# Patient Record
Sex: FEMALE | Race: WHITE | NOT HISPANIC OR LATINO | Employment: FULL TIME | URBAN - METROPOLITAN AREA
[De-identification: names, ages, dates, MRNs, and addresses within clinical notes are randomized per-mention and may not be internally consistent; named-entity substitution may affect disease eponyms.]

---

## 2018-11-09 LAB — RAPID GROUP A STREP (OHS): NEGATIVE

## 2019-04-04 ENCOUNTER — HISTORICAL (OUTPATIENT)
Dept: ADMINISTRATIVE | Facility: HOSPITAL | Age: 54
End: 2019-04-04

## 2019-04-04 LAB
ABS NEUT (OLG): 4.8 X10(3)/MCL (ref 2.1–9.2)
ALBUMIN SERPL-MCNC: 4.3 GM/DL (ref 3.4–5)
ALBUMIN/GLOB SERPL: 1.72 {RATIO} (ref 1.5–2.5)
ALP SERPL-CCNC: 49 UNIT/L (ref 38–126)
ALT SERPL-CCNC: 17 UNIT/L (ref 7–52)
APPEARANCE, UA: CLEAR
AST SERPL-CCNC: 17 UNIT/L (ref 15–37)
BACTERIA #/AREA URNS AUTO: ABNORMAL /HPF
BILIRUB SERPL-MCNC: 0.4 MG/DL (ref 0.2–1)
BILIRUB UR QL STRIP: NEGATIVE MG/DL
BILIRUBIN DIRECT+TOT PNL SERPL-MCNC: 0.1 MG/DL (ref 0–0.5)
BILIRUBIN DIRECT+TOT PNL SERPL-MCNC: 0.3 MG/DL
BUN SERPL-MCNC: 16 MG/DL (ref 7–18)
CALCIUM SERPL-MCNC: 9.8 MG/DL (ref 8.5–10)
CHLORIDE SERPL-SCNC: 102 MMOL/L (ref 98–107)
CHOLEST SERPL-MCNC: 168 MG/DL (ref 0–200)
CHOLEST/HDLC SERPL: 4 {RATIO}
CO2 SERPL-SCNC: 31 MMOL/L (ref 21–32)
COLOR UR: ABNORMAL
CREAT SERPL-MCNC: 0.79 MG/DL (ref 0.6–1.3)
ERYTHROCYTE [DISTWIDTH] IN BLOOD BY AUTOMATED COUNT: 11.1 % (ref 11.5–17)
GLOBULIN SER-MCNC: 2.5 GM/DL (ref 1.2–3)
GLUCOSE (UA): NEGATIVE MG/DL
GLUCOSE SERPL-MCNC: 102 MG/DL (ref 74–106)
HCT VFR BLD AUTO: 36.8 % (ref 37–47)
HDLC SERPL-MCNC: 42 MG/DL (ref 35–60)
HGB BLD-MCNC: 12.2 GM/DL (ref 12–16)
HGB UR QL STRIP: ABNORMAL UNIT/L
KETONES UR QL STRIP: NEGATIVE MG/DL
LDLC SERPL CALC-MCNC: 94 MG/DL (ref 0–129)
LEUKOCYTE ESTERASE UR QL STRIP: NEGATIVE UNIT/L
LYMPHOCYTES # BLD AUTO: 1.9 X10(3)/MCL (ref 0.6–3.4)
LYMPHOCYTES NFR BLD AUTO: 25.9 % (ref 13–40)
MCH RBC QN AUTO: 30.3 PG (ref 27–31.2)
MCHC RBC AUTO-ENTMCNC: 33 GM/DL (ref 32–36)
MCV RBC AUTO: 91 FL (ref 80–94)
MONOCYTES # BLD AUTO: 0.8 X10(3)/MCL (ref 0.1–1.3)
MONOCYTES NFR BLD AUTO: 10.3 % (ref 0.1–24)
NEUTROPHILS NFR BLD AUTO: 63.8 % (ref 47–80)
NITRITE UR QL STRIP.AUTO: NEGATIVE
PH UR STRIP: 5 [PH]
PLATELET # BLD AUTO: 212 X10(3)/MCL (ref 130–400)
PMV BLD AUTO: 8.8 FL (ref 9.4–12.4)
POTASSIUM SERPL-SCNC: 5.3 MMOL/L (ref 3.5–5.1)
PROT SERPL-MCNC: 6.8 GM/DL (ref 6.4–8.2)
PROT UR QL STRIP: NEGATIVE MG/DL
RBC # BLD AUTO: 4.03 X10(6)/MCL (ref 4.2–5.4)
RBC #/AREA URNS HPF: ABNORMAL /HPF
SODIUM SERPL-SCNC: 137 MMOL/L (ref 136–145)
SP GR UR STRIP: 1.01
SQUAMOUS EPITHELIAL, UA: ABNORMAL /LPF
TRIGL SERPL-MCNC: 169 MG/DL (ref 30–150)
UROBILINOGEN UR STRIP-ACNC: 0.2 MG/DL
VLDLC SERPL CALC-MCNC: 33.8 MG/DL
WBC # SPEC AUTO: 7.5 X10(3)/MCL (ref 4.5–11.5)
WBC #/AREA URNS AUTO: ABNORMAL /[HPF]

## 2019-07-29 ENCOUNTER — HISTORICAL (OUTPATIENT)
Dept: ADMINISTRATIVE | Facility: HOSPITAL | Age: 54
End: 2019-07-29

## 2020-01-20 ENCOUNTER — HISTORICAL (OUTPATIENT)
Dept: LAB | Facility: HOSPITAL | Age: 55
End: 2020-01-20

## 2020-01-20 ENCOUNTER — HISTORICAL (OUTPATIENT)
Dept: ADMINISTRATIVE | Facility: HOSPITAL | Age: 55
End: 2020-01-20

## 2020-07-24 ENCOUNTER — HISTORICAL (OUTPATIENT)
Dept: ADMINISTRATIVE | Facility: HOSPITAL | Age: 55
End: 2020-07-24

## 2020-07-24 LAB
ABS NEUT (OLG): 2.3 X10(3)/MCL (ref 2.1–9.2)
ALBUMIN SERPL-MCNC: 4.4 GM/DL (ref 3.4–5)
ALBUMIN/GLOB SERPL: 1.29 {RATIO} (ref 1.5–2.5)
ALP SERPL-CCNC: 70 UNIT/L (ref 38–126)
ALT SERPL-CCNC: 17 UNIT/L (ref 7–52)
APPEARANCE, UA: CLEAR
AST SERPL-CCNC: 21 UNIT/L (ref 15–37)
BACTERIA #/AREA URNS AUTO: ABNORMAL /HPF
BILIRUB SERPL-MCNC: 0.6 MG/DL (ref 0.2–1)
BILIRUB UR QL STRIP: NEGATIVE MG/DL
BILIRUBIN DIRECT+TOT PNL SERPL-MCNC: 0.1 MG/DL (ref 0–0.5)
BILIRUBIN DIRECT+TOT PNL SERPL-MCNC: 0.5 MG/DL
BUN SERPL-MCNC: 19 MG/DL (ref 7–18)
CALCIUM SERPL-MCNC: 9.7 MG/DL (ref 8.5–10)
CHLORIDE SERPL-SCNC: 103 MMOL/L (ref 98–107)
CHOLEST SERPL-MCNC: 176 MG/DL (ref 0–200)
CHOLEST/HDLC SERPL: 4.9 {RATIO}
CO2 SERPL-SCNC: 26 MMOL/L (ref 21–32)
COLOR UR: YELLOW
CREAT SERPL-MCNC: 0.85 MG/DL (ref 0.6–1.3)
DEPRECATED CALCIDIOL+CALCIFEROL SERPL-MC: 43.7 NG/ML (ref 30–80)
ERYTHROCYTE [DISTWIDTH] IN BLOOD BY AUTOMATED COUNT: 11.7 % (ref 11.5–17)
GLOBULIN SER-MCNC: 3.4 GM/DL (ref 1.2–3)
GLUCOSE (UA): NEGATIVE MG/DL
GLUCOSE SERPL-MCNC: 109 MG/DL (ref 74–106)
HCT VFR BLD AUTO: 38 % (ref 37–47)
HDLC SERPL-MCNC: 36 MG/DL (ref 35–60)
HGB BLD-MCNC: 12.3 GM/DL (ref 12–16)
HGB UR QL STRIP: ABNORMAL UNIT/L
KETONES UR QL STRIP: NEGATIVE MG/DL
LDLC SERPL CALC-MCNC: 109 MG/DL (ref 0–129)
LEUKOCYTE ESTERASE UR QL STRIP: NEGATIVE UNIT/L
LYMPHOCYTES # BLD AUTO: 2.2 X10(3)/MCL (ref 0.6–3.4)
LYMPHOCYTES NFR BLD AUTO: 44.4 % (ref 13–40)
MCH RBC QN AUTO: 29.1 PG (ref 27–31.2)
MCHC RBC AUTO-ENTMCNC: 32 GM/DL (ref 32–36)
MCV RBC AUTO: 90 FL (ref 80–94)
MONOCYTES # BLD AUTO: 0.5 X10(3)/MCL (ref 0.1–1.3)
MONOCYTES NFR BLD AUTO: 10.3 % (ref 0.1–24)
NEUTROPHILS NFR BLD AUTO: 45.3 % (ref 47–80)
NITRITE UR QL STRIP.AUTO: NEGATIVE
PH UR STRIP: 5.5 [PH]
PLATELET # BLD AUTO: 263 X10(3)/MCL (ref 130–400)
PMV BLD AUTO: 8.7 FL (ref 9.4–12.4)
POTASSIUM SERPL-SCNC: 4.7 MMOL/L (ref 3.5–5.1)
PROT SERPL-MCNC: 7.8 GM/DL (ref 6.4–8.2)
PROT UR QL STRIP: NEGATIVE MG/DL
RBC # BLD AUTO: 4.22 X10(6)/MCL (ref 4.2–5.4)
RBC #/AREA URNS HPF: ABNORMAL /HPF
SODIUM SERPL-SCNC: 139 MMOL/L (ref 136–145)
SP GR UR STRIP: 1.02
SQUAMOUS EPITHELIAL, UA: ABNORMAL /LPF
TRIGL SERPL-MCNC: 180 MG/DL (ref 30–150)
TSH SERPL-ACNC: 1.42 MIU/ML (ref 0.35–4.94)
UROBILINOGEN UR STRIP-ACNC: 0.2 MG/DL
VLDLC SERPL CALC-MCNC: 36 MG/DL
WBC # SPEC AUTO: 5 X10(3)/MCL (ref 4.5–11.5)
WBC #/AREA URNS AUTO: ABNORMAL /[HPF]

## 2020-07-27 LAB
EST. AVERAGE GLUCOSE BLD GHB EST-MCNC: 120 MG/DL
HBA1C MFR BLD: 5.8 % (ref 4.4–6.4)

## 2020-07-28 LAB
HPV16+18+H RISK 12 DNA CVX-IMP: NEGATIVE
PAP RECOMMENDATION EXT: NORMAL
PAP SMEAR: NORMAL

## 2020-08-05 LAB — BCS RECOMMENDATION EXT: NORMAL

## 2020-11-05 ENCOUNTER — HISTORICAL (OUTPATIENT)
Dept: ADMINISTRATIVE | Facility: HOSPITAL | Age: 55
End: 2020-11-05

## 2020-11-05 LAB
FLUAV AG NPH QL IA: NEGATIVE
FLUBV AG NPH QL IA: NEGATIVE

## 2021-02-19 ENCOUNTER — HISTORICAL (OUTPATIENT)
Dept: ADMINISTRATIVE | Facility: HOSPITAL | Age: 56
End: 2021-02-19

## 2021-02-19 LAB
ABS NEUT (OLG): 3.9 X10(3)/MCL (ref 2.1–9.2)
ALBUMIN SERPL-MCNC: 4.4 GM/DL (ref 3.4–5)
ALBUMIN/GLOB SERPL: 1.42 {RATIO} (ref 1.5–2.5)
ALP SERPL-CCNC: 105 UNIT/L (ref 38–126)
ALT SERPL-CCNC: 31 UNIT/L (ref 7–52)
AST SERPL-CCNC: 32 UNIT/L (ref 15–37)
BILIRUB SERPL-MCNC: 0.6 MG/DL (ref 0.2–1)
BILIRUBIN DIRECT+TOT PNL SERPL-MCNC: 0.1 MG/DL (ref 0–0.5)
BILIRUBIN DIRECT+TOT PNL SERPL-MCNC: 0.5 MG/DL
BUN SERPL-MCNC: 12 MG/DL (ref 7–18)
CALCIUM SERPL-MCNC: 9.9 MG/DL (ref 8.5–10)
CHLORIDE SERPL-SCNC: 102 MMOL/L (ref 98–107)
CHOLEST SERPL-MCNC: 183 MG/DL (ref 0–200)
CHOLEST/HDLC SERPL: 4.4 {RATIO}
CO2 SERPL-SCNC: 28 MMOL/L (ref 21–32)
CREAT SERPL-MCNC: 0.73 MG/DL (ref 0.6–1.3)
ERYTHROCYTE [DISTWIDTH] IN BLOOD BY AUTOMATED COUNT: 12 % (ref 11.5–17)
GLOBULIN SER-MCNC: 3.1 GM/DL (ref 1.2–3)
GLUCOSE SERPL-MCNC: 103 MG/DL (ref 74–106)
HCT VFR BLD AUTO: 40 % (ref 37–47)
HDLC SERPL-MCNC: 42 MG/DL (ref 35–60)
HGB BLD-MCNC: 13.2 GM/DL (ref 12–16)
IRON SERPL-MCNC: 112 UG/DL (ref 50–170)
LDLC SERPL CALC-MCNC: 108 MG/DL (ref 0–129)
LYMPHOCYTES # BLD AUTO: 2 X10(3)/MCL (ref 0.6–3.4)
LYMPHOCYTES NFR BLD AUTO: 30.9 % (ref 13–40)
MCH RBC QN AUTO: 29.3 PG (ref 27–31.2)
MCHC RBC AUTO-ENTMCNC: 33 GM/DL (ref 32–36)
MCV RBC AUTO: 89 FL (ref 80–94)
MONOCYTES # BLD AUTO: 0.6 X10(3)/MCL (ref 0.1–1.3)
MONOCYTES NFR BLD AUTO: 9.4 % (ref 0.1–24)
NEUTROPHILS NFR BLD AUTO: 59.7 % (ref 47–80)
PLATELET # BLD AUTO: 258 X10(3)/MCL (ref 130–400)
PMV BLD AUTO: 8.9 FL (ref 9.4–12.4)
POTASSIUM SERPL-SCNC: 4.8 MMOL/L (ref 3.5–5.1)
PROT SERPL-MCNC: 7.5 GM/DL (ref 6.4–8.2)
RBC # BLD AUTO: 4.5 X10(6)/MCL (ref 4.2–5.4)
SODIUM SERPL-SCNC: 139 MMOL/L (ref 136–145)
TRIGL SERPL-MCNC: 173 MG/DL (ref 30–150)
VLDLC SERPL CALC-MCNC: 34.6 MG/DL
WBC # SPEC AUTO: 6.5 X10(3)/MCL (ref 4.5–11.5)

## 2021-02-24 ENCOUNTER — HISTORICAL (OUTPATIENT)
Dept: ADMINISTRATIVE | Facility: HOSPITAL | Age: 56
End: 2021-02-24

## 2021-03-25 ENCOUNTER — HISTORICAL (OUTPATIENT)
Dept: RADIOLOGY | Facility: HOSPITAL | Age: 56
End: 2021-03-25

## 2021-06-27 LAB
INFLUENZA A ANTIGEN, POC: NEGATIVE
INFLUENZA B ANTIGEN, POC: NEGATIVE
SARS-COV-2 RNA RESP QL NAA+PROBE: NEGATIVE

## 2021-08-04 ENCOUNTER — HISTORICAL (OUTPATIENT)
Dept: ADMINISTRATIVE | Facility: HOSPITAL | Age: 56
End: 2021-08-04

## 2021-08-04 LAB
ABS NEUT (OLG): 3.6 X10(3)/MCL (ref 2.1–9.2)
ALBUMIN SERPL-MCNC: 4.3 GM/DL (ref 3.4–5)
ALBUMIN/GLOB SERPL: 1.54 {RATIO} (ref 1.5–2.5)
ALP SERPL-CCNC: 82 UNIT/L (ref 38–126)
ALT SERPL-CCNC: 19 UNIT/L (ref 7–52)
AST SERPL-CCNC: 23 UNIT/L (ref 15–37)
BILIRUB SERPL-MCNC: 0.4 MG/DL (ref 0.2–1)
BILIRUBIN DIRECT+TOT PNL SERPL-MCNC: 0.1 MG/DL (ref 0–0.5)
BILIRUBIN DIRECT+TOT PNL SERPL-MCNC: 0.3 MG/DL
BUN SERPL-MCNC: 14 MG/DL (ref 7–18)
CALCIUM SERPL-MCNC: 9.9 MG/DL (ref 8.5–10)
CHLORIDE SERPL-SCNC: 102 MMOL/L (ref 98–107)
CHOLEST SERPL-MCNC: 116 MG/DL (ref 0–200)
CHOLEST/HDLC SERPL: 3.3 {RATIO}
CO2 SERPL-SCNC: 33 MMOL/L (ref 21–32)
CREAT SERPL-MCNC: 0.92 MG/DL (ref 0.6–1.3)
ERYTHROCYTE [DISTWIDTH] IN BLOOD BY AUTOMATED COUNT: 11.5 % (ref 11.5–17)
FERRITIN SERPL-MCNC: 39.27 NG/ML (ref 4.63–204)
GLOBULIN SER-MCNC: 2.8 GM/DL (ref 1.2–3)
GLUCOSE SERPL-MCNC: 114 MG/DL (ref 74–106)
HCT VFR BLD AUTO: 38.4 % (ref 37–47)
HDLC SERPL-MCNC: 35 MG/DL (ref 35–60)
HGB BLD-MCNC: 12.5 GM/DL (ref 12–16)
LDLC SERPL CALC-MCNC: 48 MG/DL (ref 0–129)
LYMPHOCYTES # BLD AUTO: 2.1 X10(3)/MCL (ref 0.6–3.4)
LYMPHOCYTES NFR BLD AUTO: 34.1 % (ref 13–40)
MCH RBC QN AUTO: 29.9 PG (ref 27–31.2)
MCHC RBC AUTO-ENTMCNC: 33 GM/DL (ref 32–36)
MCV RBC AUTO: 92 FL (ref 80–94)
MONOCYTES # BLD AUTO: 0.5 X10(3)/MCL (ref 0.1–1.3)
MONOCYTES NFR BLD AUTO: 8.3 % (ref 0.1–24)
NEUTROPHILS NFR BLD AUTO: 57.6 % (ref 47–80)
PLATELET # BLD AUTO: 253 X10(3)/MCL (ref 130–400)
PMV BLD AUTO: 9.9 FL (ref 9.4–12.4)
POTASSIUM SERPL-SCNC: 4.6 MMOL/L (ref 3.5–5.1)
PROT SERPL-MCNC: 7.1 GM/DL (ref 6.4–8.2)
RBC # BLD AUTO: 4.18 X10(6)/MCL (ref 4.2–5.4)
SODIUM SERPL-SCNC: 142 MMOL/L (ref 136–145)
TRIGL SERPL-MCNC: 155 MG/DL (ref 30–150)
TSH SERPL-ACNC: 1.23 MIU/ML (ref 0.35–4.94)
VLDLC SERPL CALC-MCNC: 31 MG/DL
WBC # SPEC AUTO: 6.2 X10(3)/MCL (ref 4.5–11.5)

## 2022-01-18 LAB
INFLUENZA A ANTIGEN, POC: NEGATIVE
INFLUENZA B ANTIGEN, POC: NEGATIVE
RAPID GROUP A STREP (OHS): POSITIVE
SARS-COV-2 RNA RESP QL NAA+PROBE: NEGATIVE

## 2022-04-10 ENCOUNTER — HISTORICAL (OUTPATIENT)
Dept: ADMINISTRATIVE | Facility: HOSPITAL | Age: 57
End: 2022-04-10
Payer: COMMERCIAL

## 2022-04-24 VITALS
OXYGEN SATURATION: 97 % | HEIGHT: 67 IN | SYSTOLIC BLOOD PRESSURE: 123 MMHG | BODY MASS INDEX: 30.29 KG/M2 | WEIGHT: 193 LBS | DIASTOLIC BLOOD PRESSURE: 74 MMHG

## 2022-05-03 NOTE — HISTORICAL [facility] CERNER
This is a historical note converted from Padmini. Formatting and pictures may have been removed.  Please reference Padmini for original formatting and attached multimedia. Chief Complaint  PT C/O PERSISTENT COUGH THAT STARTED ABOUT 1 MONTH AGO. ?PT FELL IN JUNE AND INJURED RIB. ?SHE BROUGHT COPY OF XRAY.  History of Present Illness  Patient is here today with complaints of a dry?chronic cough?for the past month.? She was seen at the walk-in clinic?at the beginning of June?after falling?from a chair?and landing on the chair?with her ribs/sternum.? X-ray at that time was negative for fracture.? She did have a history of mild asthma?as a child?but nothing that she uses?an inhaler for currently.? She has seasonal allergies but lately?has not had?many symptoms. ?No nasal congestion or postnasal drip.? She did have an episode of GERD?last week?which is not typical?for her.? Her symptom of cough seems to be worse at night. ?She does have a history of breast cancer 4 years ago?treated with surgery, chemo, radiation, and tamoxifen.  Review of Systems  GENERAL:?no? fatigue,? no recent uri symptoms  RESP:?noSOB,? ?+?cough,?+ ?Hx of asthma  CARDIAC:? ?no? chest pain,? ?no? palpations  GI:? ?no? N&V,? ?no? abd pain,?+ ?GERD symptoms last week x 1 day only  NEURO:? ?no? headache,? ?no? dizziness  Physical Exam  Vitals & Measurements  HR:?100(Peripheral)? BP:?126/80?  HT:?170.1?cm? WT:?85.7?kg? BMI:?29.62?  GENERAL:? NAD  HEENT:? PERRLA, EOMI  CHEST:? CTA bilaterally  CARDIAC:? RRR, no murmurs audible  ABD:? soft, NT/ND, NBSx4  Assessment/Plan  1.?Recurrent dry cough?R05  ?CXR today neg-----Trial? OTC omeprazole?20 ?mg and claritin, call with update 10 days--consider adding Breo if persist.  Ordered:  Office/Outpatient Visit Level 3 Established 20005 PC, Recurrent dry cough, [facility], 07/29/19 15:21:00 CDT  XR Chest 2 Views, Routine, 07/29/19 15:06:00 CDT, Other (please specify), None, Ambulatory, Rad Type, Recurrent dry  cough, [facility], 07/29/19 15:06:00 CDT  ?  Orders:  999XX, 07/29/19 15:23:00 CDT, [facility], 07/29/19 15:23:00 CDT  Clinic Follow-up PRN, 07/29/19 15:21:00 CDT, [facility], Future Order  Referrals  Clinic Follow-up PRN, 07/29/19 15:21:00 CDT, [facility], Future Order   Problem List/Past Medical History  Ongoing  Breast cancer RT  Depression  Endometriosis  Hypercholesteremia  Hypertension  Kidney stone  MVP (mitral valve prolapse)  Optic nerve hemorrhage  Peripheral arterial disease  PVC  Recurrent dry cough  Historical  No qualifying data  Procedure/Surgical History  Lumpectomy of breast (2015)  Endometriosis (2003)  Histology tonsillectomy   Medications  aspirin 81 mg oral tablet, 81 mg= 1 tab(s), Oral, Daily  atorvastatin 20 mg oral tablet, 20 mg= 1 tab(s), Oral, Daily, 3 refills  Caltrate 600 mg oral tablet, 1200 mg= 2 tab(s), Oral, Daily  CLONAZEPAM 0.5 MG TAB NORT, 0.5 mg= 1 tab(s), Oral, TID  Coreg 6.25 mg oral tablet, 6.25 mg= 1 tab(s), Oral, BID, 1 refills  Fosamax 70 mg oral tablet, 70 mg= 1 tab(s), Oral, qWeek  LOSARTAN POTASSIUM 100 MG TAB, 100 mg= 1 tab(s), Oral, Daily  TAMOXIFEN 20 MG TABLET, 20 mg= 1 tab(s), Oral, Daily  VENLAFAXINE HCL  MG CAP, 150 mg= 1 cap(s), Oral, Daily  VENLAFAXINE HCL ER 75 MG CAP, 75 mg= 1 cap(s), Oral, Daily  Allergies  ACE inhibitors?(Cough)  Niaspan ER?(Rash)  Toradol  pravastatin?(numbness)  Social History  Abuse/Neglect  No, 07/29/2019  Alcohol  Never, 02/20/2018  Employment/School  Employed, 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  CAD - Coronary  artery disease: Mother.  Cancer: Mother and Father.  Depression.: Father.  Diabetes mellitus type 2: Mother.  Drug addiction.: Brother.  Hypercholesterolemia: Father.  Hypertension.: Mother.  Primary malignant neoplasm of lung: Mother.  Primary malignant neoplasm of prostate: Father.  Immunizations  Vaccine Date Status Comments   influenza virus vaccine, inactivated 09/29/2018 Recorded    influenza virus vaccine, inactivated 10/01/2017 Recorded [3/15/2018] Recd at [facility]  Health Maintenance  ???Pending?(in the next year)  ??? ??OverDue  ??? ? ? ?Diabetes Screening due??and every?  ??? ? ? ?Alcohol Misuse Screening due??01/01/19??and every 1??year(s)  ??? ??Due?  ??? ? ? ?ADL Screening due??07/29/19??and every 1??year(s)  ??? ? ? ?Hypertension Management-Education due??07/29/19??and every 1??year(s)  ??? ? ? ?Tetanus Vaccine due??07/29/19??and every 10??year(s)  ??? ??Due In Future?  ??? ? ? ?Obesity Screening not due until??01/01/20??and every 1??year(s)  ??? ? ? ?Hypertension Management-BMP not due until??04/03/20??and every 1??year(s)  ??? ? ? ?Breast Cancer Screening not due until??06/19/20??and every 2??year(s)  ??? ? ? ?Blood Pressure Screening not due until??07/28/20??and every 1??year(s)  ??? ? ? ?Body Mass Index Check not due until??07/28/20??and every 1??year(s)  ??? ? ? ?Hypertension Management-Blood Pressure not due until??07/28/20??and every 1??year(s)  ???Satisfied?(in the past 1 year)  ??? ??Satisfied?  ??? ? ? ?Blood Pressure Screening on??07/29/19.??Satisfied by Heather Austin CMA  ??? ? ? ?Body Mass Index Check on??07/29/19.??Satisfied by Heather Austin CMA  ??? ? ? ?Cervical Cancer Screening on??04/25/19.??Satisfied by Jenifer Mariano  ??? ? ? ?Depression Screening on??01/25/19.??Satisfied by Leanne Maza  ??? ? ? ?Diabetes Screening on??04/04/19.??Satisfied by Dustin Davenport  ??? ? ? ?Hypertension Management-Blood Pressure on??07/29/19.??Satisfied by Geovanna  Heather CRUZ  ??? ? ? ?Influenza Vaccine on??09/29/18.??Satisfied by Zuleika Hope LPN  ??? ? ? ?Lipid Screening on??04/04/19.??Satisfied by Dustin Davenport  ??? ? ? ?Obesity Screening on??07/29/19.??Satisfied by Heather Austin CMA  ?

## 2022-05-03 NOTE — HISTORICAL [facility] CERNER
This is a historical note converted from Padmini. Formatting and pictures may have been removed.  Please reference Padmini for original formatting and attached multimedia. Chief Complaint  PT C/O COUGH THAT IS NOT BETTER SINCE LAST SEEN. ?PT FINISHED ANTIBX AND PREDNISONE. ?PT TAKES COUGH MED AT NIGHT AND IS ABLE TO SLEEP. ?PT FEELS RATTLING IN THROAT.  History of Present Illness  Patient is here today with complaints of?persistent cough and chest congestion?for the past 10 days.? She was initially seen at the walk-in clinic?and placed on Augmentin?however her symptoms continue to worsen?and presented to our clinic on?January 14?at which time Zithromax pack was added, started on?sample Brio inhaler, and given?prednisone 20 mg daily for 5 days.? She reports that the cough syrup has helped her rest at night however?the congestion in her chest has worsened.  Review of Systems  GENERAL:?no?fever,??nochills  HEENT:??nosore throat,?+?nasal congestion,clearrhinorrhea,??nosinus pressure,??noear pain,?_lymphadenopathy  CHEST:??+?cough,?no?wheezing,?no?sob,?unable to produce?sputum production  Physical Exam  Vitals & Measurements  T:?36.7? ?C (Oral)? HR:?98(Peripheral)? BP:?116/88? SpO2:?95%?  HT:?170?cm? WT:?90.4?kg? BMI:?31.28?  GENERAL:? No apparent distress  HEENT:? mouth clear, throat ?clear, nares ?within normal limits, tympanic membranes ?wnl bilaterally, ?no?cervical lymphadenopathy  CHEST:?+ wheezing bilaterally  CARDIAC: RRR no murmurs audible  Assessment/Plan  Bronchitis?J40  ?Chest x-ray--neg ---continue Breo---- Mycoplasma PCR today--already completed zpack--add mucinex  Ordered:  Mycoplasma by PCR, Routine collect, Throat, Order for future visit, 01/20/20 14:10:00 CST, Stop date 01/20/20 14:10:00 CST, Nurse collect, Bronchitis, 01/20/20 14:10:00 CST  Office/Outpatient Visit Level 2 Established [id number] PC, Bronchitis, [facility], 01/20/20 14:10:00 CST  XR Chest 2 Views, Routine, 01/20/20 13:44:00 CST, Other  (please specify), None, Ambulatory, Rad Type, Bronchitis, [facility], 01/20/20 13:44:00 CST  ?  Orders:  999XX, 01/20/20 14:10:00 CST, [facility], 01/20/20 14:10:00 CST  Clinic Follow-up PRN, 01/20/20 14:10:00 CST, [facility], Future Order  Referrals  Clinic Follow-up PRN, 01/20/20 14:10:00 CST, [facility], Future Order   Problem List/Past Medical History  Ongoing  Breast cancer RT  Depression  Endometriosis  Hypercholesteremia  Hypertension  Kidney stone  MVP (mitral valve prolapse)  Obesity  Optic nerve hemorrhage  Peripheral arterial disease  PVC  Recurrent dry cough  Historical  No qualifying data  Procedure/Surgical History  History of left-sided carotid endarterectomy (04/17/2019)  Lumpectomy of breast (2015)  Endometriosis (2003)  Histology tonsillectomy   Medications  aspirin 81 mg oral tablet, 81 mg= 1 tab(s), Oral, Daily  atorvastatin 20 mg oral tablet, 20 mg= 1 tab(s), Oral, Daily, 3 refills  Caltrate 600 mg oral tablet, 1200 mg= 2 tab(s), Oral, Daily  chlorpheniramine-hydrocodone 8 mg-10 mg/5 mL oral suspension, extended release, 5 mL, Oral, q12hr, PRN  CLONAZEPAM 0.5 MG TAB NORT, 0.5 mg= 1 tab(s), Oral, TID  Coreg 6.25 mg oral tablet, 6.25 mg= 1 tab(s), Oral, BID, 1 refills  Fosamax 70 mg oral tablet, 70 mg= 1 tab(s), Oral, qWeek  LOSARTAN POTASSIUM 100 MG TAB, 100 mg= 1 tab(s), Oral, Daily  TAMOXIFEN 20 MG TABLET, 20 mg= 1 tab(s), Oral, Daily  VENLAFAXINE HCL  MG CAP, 150 mg= 1 cap(s), Oral, Daily  VENLAFAXINE HCL ER 75 MG CAP, 75 mg= 1 cap(s), Oral, Daily  Allergies  Toradol?(Itching)  ACE inhibitors?(Cough)  Niaspan ER?(Rash)  Trintellix?(Rash)  pravastatin?(numbness)  Social History  Abuse/Neglect  No, 01/20/2020  No, 01/14/2020  No, 09/07/2019  Alcohol  Never, 02/20/2018  Employment/School  Employed, 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  CAD - Coronary artery disease: Mother.  Cancer: Mother and Father.  Depression.: Father.  Diabetes mellitus type 2: Mother.  Drug addiction.: Brother.  Hypercholesterolemia: Father.  Hypertension.: Mother.  Primary malignant neoplasm of lung: Mother.  Primary malignant neoplasm of prostate: Father.  Immunizations  Vaccine Date Status Comments   influenza virus vaccine, inactivated 09/29/2018 Recorded    influenza virus vaccine, inactivated 10/01/2017 Recorded [3/15/2018] Recd at [facility]  Health Maintenance  ???Pending?(in the next year)  ??? ??OverDue  ??? ? ? ?Diabetes Screening due??and every?  ??? ??Due?  ??? ? ? ?Alcohol Misuse Screening due??01/01/20??and every 1??year(s)  ??? ? ? ?ADL Screening due??01/20/20??and every 1??year(s)  ??? ? ? ?Hypertension Management-Education due??01/20/20??and every 1??year(s)  ??? ? ? ?Tetanus Vaccine due??01/20/20??and every 10??year(s)  ??? ??Due In Future?  ??? ? ? ?Hypertension Management-BMP not due until??04/03/20??and every 1??year(s)  ??? ? ? ?Breast Cancer Screening not due until??06/19/20??and every 2??year(s)  ??? ? ? ?Obesity Screening not due until??01/01/21??and every 1??year(s)  ??? ? ? ?Blood Pressure Screening not due until??01/19/21??and every 1??year(s)  ??? ? ? ?Body Mass Index Check not due until??01/19/21??and every 1??year(s)  ??? ? ? ?Hypertension Management-Blood Pressure not due until??01/19/21??and every 1??year(s)  ???Satisfied?(in the past 1 year)  ??? ??Satisfied?  ??? ? ? ?Blood Pressure Screening on??01/20/20.??Satisfied by Heather Austin CMA  ??? ? ?  ?Body Mass Index Check on??01/20/20.??Satisfied by Heather Austin CMA L.  ??? ? ? ?Cervical Cancer Screening on??04/25/19.??Satisfied by Jenifer Mariano  ??? ? ? ?Depression Screening on??01/25/19.??Satisfied by Leanne Maza  ??? ? ? ?Diabetes Screening on??04/04/19.??Satisfied by Dustin Davenport  ??? ? ? ?Hypertension Management-Blood Pressure on??01/20/20.??Satisfied by Heather Austin CMA L.  ??? ? ? ?Lipid Screening on??04/04/19.??Satisfied by Dustin Davenport  ??? ? ? ?Obesity Screening on??01/20/20.??Satisfied by Heather Austin CMA L.  ?

## 2022-09-01 PROCEDURE — 81291 MTHFR GENE: CPT | Performed by: NURSE PRACTITIONER

## 2022-09-01 PROCEDURE — 82746 ASSAY OF FOLIC ACID SERUM: CPT | Performed by: NURSE PRACTITIONER

## 2022-09-01 PROCEDURE — 84591 ASSAY OF NOS VITAMIN: CPT | Performed by: NURSE PRACTITIONER

## 2022-09-01 PROCEDURE — 82607 VITAMIN B-12: CPT | Performed by: NURSE PRACTITIONER

## 2022-09-21 ENCOUNTER — HISTORICAL (OUTPATIENT)
Dept: ADMINISTRATIVE | Facility: HOSPITAL | Age: 57
End: 2022-09-21
Payer: COMMERCIAL

## 2022-12-12 ENCOUNTER — PATIENT OUTREACH (OUTPATIENT)
Dept: ADMINISTRATIVE | Facility: HOSPITAL | Age: 57
End: 2022-12-12
Payer: COMMERCIAL

## 2022-12-20 ENCOUNTER — DOCUMENTATION ONLY (OUTPATIENT)
Dept: INTERNAL MEDICINE | Facility: CLINIC | Age: 57
End: 2022-12-20
Payer: COMMERCIAL

## 2023-02-22 ENCOUNTER — DOCUMENTATION ONLY (OUTPATIENT)
Dept: ADMINISTRATIVE | Facility: HOSPITAL | Age: 58
End: 2023-02-22
Payer: COMMERCIAL

## 2023-04-19 PROBLEM — Z23 IMMUNIZATION DUE: Status: ACTIVE | Noted: 2023-04-19

## 2023-04-19 PROBLEM — Z17.0 MALIGNANT NEOPLASM OF UPPER-OUTER QUADRANT OF RIGHT BREAST IN FEMALE, ESTROGEN RECEPTOR POSITIVE: Status: ACTIVE | Noted: 2020-08-25

## 2023-04-19 PROBLEM — C50.411 MALIGNANT NEOPLASM OF UPPER-OUTER QUADRANT OF RIGHT BREAST IN FEMALE, ESTROGEN RECEPTOR POSITIVE: Status: ACTIVE | Noted: 2020-08-25

## 2023-04-19 PROBLEM — I10 PRIMARY HYPERTENSION: Status: ACTIVE | Noted: 2023-04-19

## 2023-04-19 PROBLEM — Z00.00 ENCOUNTER FOR WELLNESS EXAMINATION IN ADULT: Status: ACTIVE | Noted: 2023-04-19

## 2023-04-19 PROBLEM — F32.A DEPRESSION: Status: ACTIVE | Noted: 2023-04-19

## 2023-04-19 PROBLEM — I77.9 CAROTID ARTERY DISEASE: Status: ACTIVE | Noted: 2023-04-19

## 2023-04-19 PROBLEM — M85.80 OSTEOPENIA: Status: ACTIVE | Noted: 2023-04-19

## 2023-04-19 PROBLEM — E78.00 HYPERCHOLESTEROLEMIA: Status: ACTIVE | Noted: 2023-04-19

## 2023-04-19 PROBLEM — R73.9 HYPERGLYCEMIA: Status: ACTIVE | Noted: 2023-04-19

## 2023-04-20 PROBLEM — E55.9 VITAMIN D DEFICIENCY: Status: ACTIVE | Noted: 2023-04-20

## 2023-06-13 RX ORDER — ATORVASTATIN CALCIUM 80 MG/1
TABLET, FILM COATED ORAL
Qty: 90 TABLET | Refills: 3 | Status: SHIPPED | OUTPATIENT
Start: 2023-06-13

## 2023-07-24 PROBLEM — Z00.00 ENCOUNTER FOR WELLNESS EXAMINATION IN ADULT: Status: RESOLVED | Noted: 2023-04-19 | Resolved: 2023-07-24

## 2023-08-22 ENCOUNTER — TELEPHONE (OUTPATIENT)
Dept: PSYCHIATRY | Facility: CLINIC | Age: 58
End: 2023-08-22
Payer: COMMERCIAL

## 2023-08-22 NOTE — TELEPHONE ENCOUNTER
----- Message from Hilario Cherry MA sent at 8/21/2023  2:54 PM CDT -----  Pt called in to get some info. About ECT treatment she can be reached at [phone]

## 2024-04-21 PROBLEM — I77.9 CAROTID ARTERY DISEASE: Status: RESOLVED | Noted: 2023-04-19 | Resolved: 2024-04-21

## 2024-05-29 RX ORDER — ATORVASTATIN CALCIUM 80 MG/1
TABLET, FILM COATED ORAL
Qty: 90 TABLET | Refills: 3 | Status: SHIPPED | OUTPATIENT
Start: 2024-05-29

## 2024-07-22 PROBLEM — Z00.00 ENCOUNTER FOR WELLNESS EXAMINATION IN ADULT: Status: RESOLVED | Noted: 2023-04-19 | Resolved: 2024-07-22

## 2025-01-24 PROBLEM — J06.9 URI, ACUTE: Status: ACTIVE | Noted: 2025-01-24

## 2025-03-20 PROCEDURE — 83880 ASSAY OF NATRIURETIC PEPTIDE: CPT | Performed by: FAMILY MEDICINE

## 2025-04-22 PROBLEM — Z98.890 HISTORY OF LEFT-SIDED CAROTID ENDARTERECTOMY: Status: ACTIVE | Noted: 2025-04-22

## 2025-04-22 PROBLEM — R73.03 PREDIABETES: Status: ACTIVE | Noted: 2023-04-19

## 2025-05-20 RX ORDER — ATORVASTATIN CALCIUM 80 MG/1
80 TABLET, FILM COATED ORAL
Qty: 90 TABLET | Refills: 3 | Status: SHIPPED | OUTPATIENT
Start: 2025-05-20

## 2025-07-07 ENCOUNTER — CLINICAL SUPPORT (OUTPATIENT)
Dept: RESPIRATORY THERAPY | Facility: HOSPITAL | Age: 60
End: 2025-07-07
Attending: INTERNAL MEDICINE
Payer: COMMERCIAL

## 2025-07-07 VITALS — OXYGEN SATURATION: 96 % | HEART RATE: 84 BPM | RESPIRATION RATE: 18 BRPM

## 2025-07-07 DIAGNOSIS — R05.3 CHRONIC COUGH: ICD-10-CM

## 2025-07-07 PROCEDURE — 63600175 PHARM REV CODE 636 W HCPCS: Performed by: INTERNAL MEDICINE

## 2025-07-07 PROCEDURE — 99900035 HC TECH TIME PER 15 MIN (STAT)

## 2025-07-07 PROCEDURE — 99900031 HC PATIENT EDUCATION (STAT)

## 2025-07-07 PROCEDURE — 95070 INHLJ BRNCL CHALLENGE TSTG: CPT

## 2025-07-07 PROCEDURE — 94726 PLETHYSMOGRAPHY LUNG VOLUMES: CPT

## 2025-07-07 PROCEDURE — 25000242 PHARM REV CODE 250 ALT 637 W/ HCPCS: Performed by: INTERNAL MEDICINE

## 2025-07-07 PROCEDURE — 94761 N-INVAS EAR/PLS OXIMETRY MLT: CPT

## 2025-07-07 PROCEDURE — 94760 N-INVAS EAR/PLS OXIMETRY 1: CPT

## 2025-07-07 RX ORDER — METHACHOLINE CHLORIDE 0.1875/3ML
3 VIAL, NEBULIZER (ML) INHALATION ONCE
Status: COMPLETED | OUTPATIENT
Start: 2025-07-07 | End: 2025-07-07

## 2025-07-07 RX ORDER — ALBUTEROL SULFATE 0.83 MG/ML
SOLUTION RESPIRATORY (INHALATION)
Status: DISCONTINUED
Start: 2025-07-07 | End: 2025-07-07 | Stop reason: HOSPADM

## 2025-07-07 RX ORDER — METHACHOLINE CHLORIDE 3 MG/3 ML
3 VIAL, NEBULIZER (ML) INHALATION ONCE
Status: COMPLETED | OUTPATIENT
Start: 2025-07-07 | End: 2025-07-07

## 2025-07-07 RX ORDER — METHACHOLINE CHLORIDE 48 MG/3 ML
3 VIAL, NEBULIZER (ML) INHALATION ONCE
Status: ACTIVE | OUTPATIENT
Start: 2025-07-07

## 2025-07-07 RX ORDER — METHACHOLINE CHLORIDE 12 MG/3 ML
3 VIAL, NEBULIZER (ML) INHALATION ONCE
Status: COMPLETED | OUTPATIENT
Start: 2025-07-07 | End: 2025-07-07

## 2025-07-07 RX ORDER — ALBUTEROL SULFATE 0.83 MG/ML
2.5 SOLUTION RESPIRATORY (INHALATION)
Status: COMPLETED | OUTPATIENT
Start: 2025-07-07 | End: 2025-07-07

## 2025-07-07 RX ORDER — METHACHOLINE CHLORIDE 0 MG/3 ML
3 VIAL, NEBULIZER (ML) INHALATION ONCE
Status: COMPLETED | OUTPATIENT
Start: 2025-07-07 | End: 2025-07-07

## 2025-07-07 RX ORDER — METHACHOLINE CHLORIDE 0.75/3ML
3 VIAL, NEBULIZER (ML) INHALATION ONCE
Status: COMPLETED | OUTPATIENT
Start: 2025-07-07 | End: 2025-07-07

## 2025-07-07 RX ADMIN — Medication 0.75 MG: at 10:07

## 2025-07-07 RX ADMIN — ALBUTEROL SULFATE 2.5 MG: 2.5 SOLUTION RESPIRATORY (INHALATION) at 10:07

## 2025-07-07 RX ADMIN — ALBUTEROL SULFATE 2.5 MG: 2.5 SOLUTION RESPIRATORY (INHALATION) at 11:07

## 2025-07-07 RX ADMIN — Medication 0.19 MG: at 10:07

## 2025-07-07 RX ADMIN — METHACHOLINE CHLORIDE INHALATION SOLUTION 3 ML: KIT at 10:07

## 2025-07-07 RX ADMIN — Medication 3 MG: at 10:07

## 2025-07-07 RX ADMIN — Medication 12 MG: at 10:07

## 2025-07-07 NOTE — PROGRESS NOTES
Methacholine challenge complete. Dosing stopped at 4mg/mL. 2.5mg Albuterol given post. Patient gave great effort and reports improved breathing after neb, but did not fully reverse. A second dose of Albuterol was given. Patient stated she felt fine after second albuterol. Instructed pt to use her inhaler as needed at home, or go to the ER if breathing was difficult. See order for results.